# Patient Record
Sex: FEMALE | Race: WHITE | NOT HISPANIC OR LATINO | URBAN - METROPOLITAN AREA
[De-identification: names, ages, dates, MRNs, and addresses within clinical notes are randomized per-mention and may not be internally consistent; named-entity substitution may affect disease eponyms.]

---

## 2019-02-07 ENCOUNTER — APPOINTMENT (RX ONLY)
Dept: URBAN - METROPOLITAN AREA CLINIC 51 | Facility: CLINIC | Age: 69
Setting detail: DERMATOLOGY
End: 2019-02-07

## 2019-02-07 DIAGNOSIS — L20.89 OTHER ATOPIC DERMATITIS: ICD-10-CM

## 2019-02-07 PROBLEM — I10 ESSENTIAL (PRIMARY) HYPERTENSION: Status: ACTIVE | Noted: 2019-02-07

## 2019-02-07 PROBLEM — L20.84 INTRINSIC (ALLERGIC) ECZEMA: Status: ACTIVE | Noted: 2019-02-07

## 2019-02-07 PROCEDURE — ? COUNSELING

## 2019-02-07 PROCEDURE — ? TREATMENT REGIMEN

## 2019-02-07 PROCEDURE — 99201: CPT

## 2019-02-07 PROCEDURE — ? PRESCRIPTION SAMPLES PROVIDED

## 2019-02-07 ASSESSMENT — LOCATION SIMPLE DESCRIPTION DERM
LOCATION SIMPLE: RIGHT HAND
LOCATION SIMPLE: LEFT HAND

## 2019-02-07 ASSESSMENT — LOCATION DETAILED DESCRIPTION DERM
LOCATION DETAILED: LEFT RADIAL PALM
LOCATION DETAILED: RIGHT RADIAL PALM

## 2019-02-07 ASSESSMENT — LOCATION ZONE DERM: LOCATION ZONE: HAND

## 2019-02-07 ASSESSMENT — BSA ECZEMA: % BODY COVERED IN ECZEMA: 2

## 2019-02-07 ASSESSMENT — SEVERITY ASSESSMENT: SEVERITY: MILD

## 2019-02-07 NOTE — HPI: RASH
What Type Of Note Output Would You Prefer (Optional)?: Standard Output
How Severe Is Your Rash?: mild
Is This A New Presentation, Or A Follow-Up?: Rash
Additional History: Patient states she’s had eczema for 50+ years. She states she only likes using creams when broken out, and would rather try to stay clear with watching what she eats, etc.

## 2019-02-07 NOTE — PROCEDURE: TREATMENT REGIMEN
Initiate Treatment: Eucrisa
Continue Regimen: Tacrolimus \\nDesoximethasone
Otc Regimen: Vanicream products \\nCeraVe anti itch moisturizer \\nForces of nature cleaning products
Detail Level: Simple